# Patient Record
Sex: MALE | Race: WHITE | Employment: OTHER | URBAN - METROPOLITAN AREA
[De-identification: names, ages, dates, MRNs, and addresses within clinical notes are randomized per-mention and may not be internally consistent; named-entity substitution may affect disease eponyms.]

---

## 2024-10-22 ENCOUNTER — OFFICE VISIT (OUTPATIENT)
Dept: URGENT CARE | Facility: CLINIC | Age: 69
End: 2024-10-22
Payer: MEDICARE

## 2024-10-22 VITALS
SYSTOLIC BLOOD PRESSURE: 130 MMHG | OXYGEN SATURATION: 99 % | RESPIRATION RATE: 17 BRPM | DIASTOLIC BLOOD PRESSURE: 72 MMHG | WEIGHT: 197 LBS | TEMPERATURE: 96.9 F | BODY MASS INDEX: 29.18 KG/M2 | HEIGHT: 69 IN | HEART RATE: 83 BPM

## 2024-10-22 DIAGNOSIS — S61.012A LACERATION OF LEFT THUMB WITHOUT FOREIGN BODY WITHOUT DAMAGE TO NAIL, INITIAL ENCOUNTER: Primary | ICD-10-CM

## 2024-10-22 PROCEDURE — 12001 RPR S/N/AX/GEN/TRNK 2.5CM/<: CPT | Performed by: NURSE PRACTITIONER

## 2024-10-22 PROCEDURE — G0463 HOSPITAL OUTPT CLINIC VISIT: HCPCS | Performed by: NURSE PRACTITIONER

## 2024-10-22 PROCEDURE — 99214 OFFICE O/P EST MOD 30 MIN: CPT | Performed by: NURSE PRACTITIONER

## 2024-10-22 RX ORDER — DILTIAZEM HYDROCHLORIDE 360 MG/1
1 CAPSULE, EXTENDED RELEASE ORAL DAILY
COMMUNITY
Start: 2024-07-23

## 2024-10-22 RX ORDER — INSULIN GLARGINE 300 U/ML
INJECTION, SOLUTION SUBCUTANEOUS
COMMUNITY
Start: 2024-08-09

## 2024-10-22 RX ORDER — ROSUVASTATIN CALCIUM 40 MG/1
1 TABLET, COATED ORAL DAILY
COMMUNITY
Start: 2024-08-15

## 2024-10-22 RX ORDER — INSULIN GLARGINE 300 U/ML
50-60 INJECTION, SOLUTION SUBCUTANEOUS
COMMUNITY
Start: 2024-09-10

## 2024-10-22 RX ORDER — FENOFIBRATE 160 MG/1
160 TABLET ORAL DAILY
COMMUNITY

## 2024-10-22 RX ORDER — OLMESARTAN MEDOXOMIL AND HYDROCHLOROTHIAZIDE 40/25 40; 25 MG/1; MG/1
1 TABLET ORAL DAILY
COMMUNITY
Start: 2024-09-24

## 2024-10-22 NOTE — PROGRESS NOTES
[facility]        NAME: Jamie Lewis is a 69 y.o. male  : 1955    MRN: [id number]  DATE: 2024  TIME: 12:01 PM    Assessment and Plan   Laceration of left thumb without foreign body without damage to nail, initial encounter [S61.012A]  1. Laceration of left thumb without foreign body without damage to nail, initial encounter          Glue and steri strips applied to thumb laceration. Bleeding was controlled upon arrival. Advised to keep thumb covered when doing upcoming moving to avoid banging or contamination of the wound while it heals. He is UTD on tetanus and advised since the mug was porcelin and laceration was not from metal, low likelihood for tetanus at this time.     Patient Instructions       Follow up with PCP in 3-5 days.  Proceed to  ER if symptoms worsen.    If tests are performed, our office will contact you with results only if changes need to made to the care plan discussed with you at the visit. [facility]    Chief Complaint     Chief Complaint   Patient presents with    Thumb Laceration     Pt states he cut his left thumb on a broken porcelain cup this morning around 0930. Pt states last Tetanus shot was within the last 2-3 years.          History of Present Illness       Patient was pushing down garbage in the garbage can and he cut his thumb on a broken mug that had been thrown away the prior night. States the bleeding has stopped but he is packing/moving in the next week and is concerned the bleeding will restart. Takes 81 mg aspirin daily.         Review of Systems   Review of Systems   Constitutional:  Negative for chills, fatigue and fever.   Musculoskeletal:  Negative for arthralgias, joint swelling and myalgias.   Skin:  Positive for wound (left thumb). Negative for color change, pallor and rash.   Neurological:  Negative for weakness and numbness.         Current Medications       Current Outpatient Medications:     " diltiazem (CARDIZEM CD) 360 MG 24 hr capsule, Take 1 capsule by mouth in the morning, Disp: , Rfl:     Empagliflozin-metFORMIN HCl 12.5-1000 MG TABS, Take 1 tablet by mouth, Disp: , Rfl:     fenofibrate 160 MG tablet, Take 160 mg by mouth daily, Disp: , Rfl:     insulin glargine (Toujeo SoloStar) 300 units/mL CONCENTRATED U-300 injection pen (1-unit dial), Inject 50-60 Units under the skin, Disp: , Rfl:     Mounjaro 7.5 MG/0.5ML, INJECT THE CONTENTS OF ONE PEN UNDER THE SKIN WEEKLY ON THE SAME DAY EACH WEEK, Disp: , Rfl:     olmesartan-hydrochlorothiazide (BENICAR HCT) 40-25 MG per tablet, Take 1 tablet by mouth in the morning, Disp: , Rfl:     rosuvastatin (CRESTOR) 40 MG tablet, Take 1 tablet by mouth in the morning, Disp: , Rfl:     Toujeo Max SoloStar 300 units/mL CONCENTRATED U-300 injection pen (2-unit dial), , Disp: , Rfl:     Mounjaro 5 MG/0.5ML, Inject 5 mg under the skin Once a week (Patient not taking: Reported on 10/22/2024), Disp: , Rfl:     Current Allergies     Allergies as of 10/22/2024 - Reviewed 10/22/2024   Allergen Reaction Noted    Oxycodone-aspirin Hives 09/30/2005    Penicillin g Rash and Swelling 09/20/1983    Penicillins Other (See Comments), Rash, and Swelling 09/20/1983    Propoxyphene Hives 11/17/2008            The following portions of the patient's history were reviewed and updated as appropriate: allergies, current medications, past family history, past medical history, past social history, past surgical history and problem list.     Past Medical History:   Diagnosis Date    Diabetes mellitus (HCC)     Hypertension        History reviewed. No pertinent surgical history.    History reviewed. No pertinent family history.      Medications have been verified.        Objective   /72   Pulse 83   Temp (!) 96.9 °F (36.1 °C)   Resp 17   Ht 5' 9\" (1.753 m)   Wt 89.4 kg (197 lb)   SpO2 99%   BMI 29.09 kg/m²        Physical Exam     Physical Exam  Vitals and nursing note reviewed. " "  Constitutional:       General: He is not in acute distress.     Appearance: Normal appearance. He is not ill-appearing.   HENT:      Head: Normocephalic and atraumatic.   Cardiovascular:      Rate and Rhythm: Normal rate and regular rhythm.      Heart sounds: Normal heart sounds, S1 normal and S2 normal.   Pulmonary:      Effort: Pulmonary effort is normal. No accessory muscle usage.      Breath sounds: Normal breath sounds. No wheezing.   Musculoskeletal:      Left hand: Laceration present. No tenderness or bony tenderness. Normal range of motion. Normal strength. Normal sensation. There is no disruption of two-point discrimination. Normal capillary refill. Normal pulse.        Hands:       Comments: 2 cm linear laceration just inferior to joint lines   Skin:     General: Skin is warm and dry.      Capillary Refill: Capillary refill takes less than 2 seconds.      Findings: No rash.   Neurological:      General: No focal deficit present.      Mental Status: He is alert and oriented to person, place, and time.      Motor: Motor function is intact.   Psychiatric:         Attention and Perception: Attention and perception normal.         Mood and Affect: Mood and affect normal.         Speech: Speech normal.         Behavior: Behavior normal. Behavior is cooperative.         Thought Content: Thought content normal.           Universal Protocol:  procedure performed by consultantConsent: Verbal consent obtained.  Risks and benefits: risks, benefits and alternatives were discussed  Consent given by: patient  Time out: Immediately prior to procedure a \"time out\" was called to verify the correct patient, procedure, equipment, support staff and site/side marked as required.  Timeout called at: 10/22/2024 11:58 AM.  Patient understanding: patient states understanding of the procedure being performed  Patient consent: the patient's understanding of the procedure matches consent given  Patient identity confirmed: verbally " with patient and provided demographic data  Laceration repair    Date/Time: 10/22/2024 10:30 AM    Performed by: IZZY Rosario  Authorized by: IZZY Rosario  Body area: upper extremity  Location details: left thumb  Foreign bodies: no foreign bodies  Tendon involvement: none  Nerve involvement: none  Vascular damage: no    Sedation:  Patient sedated: no      Wound Dehiscence:  Superficial Wound Dehiscence: simple closure      Procedure Details:  Preparation: Patient was prepped and draped in the usual sterile fashion.  Irrigation solution: tap water  Irrigation method: tap  Amount of cleaning: standard  Debridement: none  Degree of undermining: none  Skin closure: glue and Steri-Strips  Approximation: close  Approximation difficulty: simple  Patient tolerance: patient tolerated the procedure well with no immediate complications